# Patient Record
Sex: FEMALE | Race: WHITE | NOT HISPANIC OR LATINO | Employment: STUDENT | URBAN - METROPOLITAN AREA
[De-identification: names, ages, dates, MRNs, and addresses within clinical notes are randomized per-mention and may not be internally consistent; named-entity substitution may affect disease eponyms.]

---

## 2023-08-11 LAB
ALANINE AMINOTRANSFERASE (SGPT) (U/L) IN SER/PLAS: 34 U/L (ref 7–45)
ALBUMIN (G/DL) IN SER/PLAS: 5 G/DL (ref 3.4–5)
ALKALINE PHOSPHATASE (U/L) IN SER/PLAS: 47 U/L (ref 33–110)
ANION GAP IN SER/PLAS: 15 MMOL/L (ref 10–20)
ASPARTATE AMINOTRANSFERASE (SGOT) (U/L) IN SER/PLAS: 28 U/L (ref 9–39)
BILIRUBIN TOTAL (MG/DL) IN SER/PLAS: 0.9 MG/DL (ref 0–1.2)
CALCIUM (MG/DL) IN SER/PLAS: 10 MG/DL (ref 8.6–10.3)
CARBON DIOXIDE, TOTAL (MMOL/L) IN SER/PLAS: 25 MMOL/L (ref 21–32)
CHLORIDE (MMOL/L) IN SER/PLAS: 102 MMOL/L (ref 98–107)
CREATININE (MG/DL) IN SER/PLAS: 0.84 MG/DL (ref 0.5–1.05)
ERYTHROCYTE DISTRIBUTION WIDTH (RATIO) BY AUTOMATED COUNT: 12.5 % (ref 11.5–14.5)
ERYTHROCYTE MEAN CORPUSCULAR HEMOGLOBIN CONCENTRATION (G/DL) BY AUTOMATED: 32.5 G/DL (ref 32–36)
ERYTHROCYTE MEAN CORPUSCULAR VOLUME (FL) BY AUTOMATED COUNT: 89 FL (ref 80–100)
ERYTHROCYTES (10*6/UL) IN BLOOD BY AUTOMATED COUNT: 5.07 X10E12/L (ref 4–5.2)
GFR FEMALE: >90 ML/MIN/1.73M2
GLUCOSE (MG/DL) IN SER/PLAS: 66 MG/DL (ref 74–99)
HEMATOCRIT (%) IN BLOOD BY AUTOMATED COUNT: 45.3 % (ref 36–46)
HEMOGLOBIN (G/DL) IN BLOOD: 14.7 G/DL (ref 12–16)
LEUKOCYTES (10*3/UL) IN BLOOD BY AUTOMATED COUNT: 5.2 X10E9/L (ref 4.4–11.3)
MAGNESIUM (MG/DL) IN SER/PLAS: 2.34 MG/DL (ref 1.6–2.4)
PLATELETS (10*3/UL) IN BLOOD AUTOMATED COUNT: 322 X10E9/L (ref 150–450)
POTASSIUM (MMOL/L) IN SER/PLAS: 4.5 MMOL/L (ref 3.5–5.3)
PROTEIN TOTAL: 8 G/DL (ref 6.4–8.2)
SODIUM (MMOL/L) IN SER/PLAS: 137 MMOL/L (ref 136–145)
THYROTROPIN (MIU/L) IN SER/PLAS BY DETECTION LIMIT <= 0.05 MIU/L: 2.73 MIU/L (ref 0.44–3.98)
UREA NITROGEN (MG/DL) IN SER/PLAS: 12 MG/DL (ref 6–23)

## 2023-08-12 LAB
CALCIDIOL (25 OH VITAMIN D3) (NG/ML) IN SER/PLAS: 39 NG/ML
COBALAMIN (VITAMIN B12) (PG/ML) IN SER/PLAS: 432 PG/ML (ref 211–911)

## 2023-08-14 LAB
AMORPHOUS CRYSTALS, URINE: NORMAL /HPF
APPEARANCE, URINE: NORMAL
ASCORBIC ACID: NORMAL MG/DL
BACTERIA, URINE: NORMAL /HPF
BILIRUBIN, URINE: NORMAL
BLOOD, URINE: NORMAL
BROAD CASTS, URINE: NORMAL /LPF
BUDDING YEAST, URINE: NORMAL /HPF
CALCIUM CARBONATE CRYSTALS, URINE: NORMAL /HPF
CALCIUM OXALATE CRYSTALS, URINE: NORMAL /HPF
CALCIUM PHOSPHATE CRYSTALS, URINE: NORMAL /HPF
COLOR, URINE: NORMAL
CYSTINE CRYSTALS, URINE: NORMAL /HPF
EPITHELIAL CASTS, URINE: NORMAL /LPF
FAT, URINE: NORMAL /HPF
FATTY CASTS, URINE: NORMAL /LPF
FINE GRANULAR CASTS, URINE: NORMAL /LPF
GLUCOSE, URINE: NORMAL
HYALINE CASTS, URINE: NORMAL /LPF
KETONES, URINE: NORMAL
LEUCINE CRYSTALS, URINE: NORMAL /HPF
LEUKOCYTE ESTERASE, URINE: NORMAL
MIXED CELLULAR CASTS, URINE: NORMAL /LPF
MUCUS, URINE: NORMAL /LPF
NITRITE, URINE: NORMAL
OVAL FAT BODIES, URINE: NORMAL /HPF
PH, URINE: NORMAL
PROTEIN, URINE: NORMAL
RBC CASTS, URINE: NORMAL /LPF
RBC CLUMPS, URINE: NORMAL /HPF
RBC, URINE: NORMAL
RENAL EPITHELIAL CELLS, URINE: NORMAL /HPF
SPECIFIC GRAVITY, URINE: NORMAL
SPERMATOZOA, URINE: NORMAL /HPF
SQUAMOUS EPITHELIAL CELLS, URINE: NORMAL /HPF
TRANSITIONAL EPITHELIAL CELLS, URINE: NORMAL /HPF
TRICHOMONAS, URINE: NORMAL /HPF
TRIPLE PHOSPHATE CRYSTALS, URINE: NORMAL /HPF
TYROSINE CRYSTALS, URINE: NORMAL /HPF
URIC ACID (URATE) CRYSTALS, URINE: NORMAL /HPF
URINALYSIS MICROSCOPIC COMMENT: NORMAL
UROBILINOGEN, URINE: NORMAL
WAXY CASTS, URINE: NORMAL /LPF
WBC CASTS, URINE: NORMAL /LPF
WBC CLUMPS, URINE: NORMAL /HPF
WBC, URINE: NORMAL
YEAST HYPHAE, URINE: NORMAL /HPF

## 2023-12-14 ENCOUNTER — LAB (OUTPATIENT)
Dept: LAB | Facility: LAB | Age: 23
End: 2023-12-14
Payer: COMMERCIAL

## 2023-12-14 DIAGNOSIS — E78.01 FAMILIAL HYPERCHOLESTEROLEMIA: Primary | ICD-10-CM

## 2023-12-14 LAB
ALBUMIN SERPL BCP-MCNC: 4.7 G/DL (ref 3.4–5)
ALP SERPL-CCNC: 45 U/L (ref 33–110)
ALT SERPL W P-5'-P-CCNC: 32 U/L (ref 7–45)
ANION GAP SERPL CALC-SCNC: 13 MMOL/L (ref 10–20)
AST SERPL W P-5'-P-CCNC: 26 U/L (ref 9–39)
BILIRUB SERPL-MCNC: 0.7 MG/DL (ref 0–1.2)
BUN SERPL-MCNC: 16 MG/DL (ref 6–23)
CALCIUM SERPL-MCNC: 9.6 MG/DL (ref 8.6–10.3)
CHLORIDE SERPL-SCNC: 103 MMOL/L (ref 98–107)
CHOLEST SERPL-MCNC: 141 MG/DL (ref 0–199)
CHOLESTEROL/HDL RATIO: 2.7
CO2 SERPL-SCNC: 27 MMOL/L (ref 21–32)
CREAT SERPL-MCNC: 0.72 MG/DL (ref 0.5–1.05)
GFR SERPL CREATININE-BSD FRML MDRD: >90 ML/MIN/1.73M*2
GLUCOSE SERPL-MCNC: 71 MG/DL (ref 74–99)
HDLC SERPL-MCNC: 53.2 MG/DL
LDLC SERPL CALC-MCNC: 77 MG/DL
NON HDL CHOLESTEROL: 88 MG/DL (ref 0–149)
POTASSIUM SERPL-SCNC: 4.3 MMOL/L (ref 3.5–5.3)
PROT SERPL-MCNC: 7 G/DL (ref 6.4–8.2)
SODIUM SERPL-SCNC: 139 MMOL/L (ref 136–145)
TRIGL SERPL-MCNC: 55 MG/DL (ref 0–149)
VLDL: 11 MG/DL (ref 0–40)

## 2023-12-14 PROCEDURE — 80061 LIPID PANEL: CPT

## 2023-12-14 PROCEDURE — 80053 COMPREHEN METABOLIC PANEL: CPT

## 2023-12-14 PROCEDURE — 36415 COLL VENOUS BLD VENIPUNCTURE: CPT

## 2023-12-19 ENCOUNTER — APPOINTMENT (OUTPATIENT)
Dept: ENDOCRINOLOGY | Facility: CLINIC | Age: 23
End: 2023-12-19
Payer: COMMERCIAL

## 2023-12-22 DIAGNOSIS — E78.01 FAMILIAL HYPERCHOLESTEROLEMIA: Primary | ICD-10-CM

## 2023-12-24 RX ORDER — ROSUVASTATIN CALCIUM 40 MG/1
40 TABLET, COATED ORAL DAILY
Qty: 90 TABLET | Refills: 3 | Status: SHIPPED | OUTPATIENT
Start: 2023-12-24

## 2024-03-19 DIAGNOSIS — E78.01 FAMILIAL HYPERCHOLESTEROLEMIA: Primary | ICD-10-CM

## 2024-03-19 RX ORDER — EZETIMIBE 10 MG/1
10 TABLET ORAL DAILY
COMMUNITY
End: 2024-03-19 | Stop reason: SDUPTHER

## 2024-03-19 RX ORDER — EZETIMIBE 10 MG/1
10 TABLET ORAL DAILY
Qty: 90 TABLET | Refills: 3 | Status: SHIPPED | OUTPATIENT
Start: 2024-03-19 | End: 2025-03-19

## 2024-05-01 ENCOUNTER — OFFICE VISIT (OUTPATIENT)
Dept: ENDOCRINOLOGY | Facility: CLINIC | Age: 24
End: 2024-05-01
Payer: COMMERCIAL

## 2024-05-01 VITALS
BODY MASS INDEX: 19.3 KG/M2 | WEIGHT: 116 LBS | HEART RATE: 80 BPM | DIASTOLIC BLOOD PRESSURE: 68 MMHG | SYSTOLIC BLOOD PRESSURE: 106 MMHG

## 2024-05-01 DIAGNOSIS — E78.01 FAMILIAL HYPERCHOLESTEROLEMIA DUE TO HETEROZYGOUS LOW DENSITY LIPOPROTEIN (LDL) RECEPTOR MUTATION: Primary | ICD-10-CM

## 2024-05-01 PROCEDURE — 99214 OFFICE O/P EST MOD 30 MIN: CPT | Performed by: INTERNAL MEDICINE

## 2024-05-01 PROCEDURE — 1036F TOBACCO NON-USER: CPT | Performed by: INTERNAL MEDICINE

## 2024-05-01 ASSESSMENT — ENCOUNTER SYMPTOMS
DIARRHEA: 0
TROUBLE SWALLOWING: 0
FATIGUE: 0
NERVOUS/ANXIOUS: 0
WEAKNESS: 0
PALPITATIONS: 0
UNEXPECTED WEIGHT CHANGE: 0
HEADACHES: 0
ABDOMINAL PAIN: 0
SHORTNESS OF BREATH: 0
CONSTIPATION: 0
VOMITING: 0
NAUSEA: 0
FEVER: 0
NECK PAIN: 0
TREMORS: 0

## 2024-05-01 NOTE — LETTER
May 1, 2024     Papito Randall MD  [address] [facility] [address]  [place] [address]    Patient: Capri Mcintosh   YOB: 2000   Date of Visit: 5/1/2024       Dear Dr. Papito Randall MD:    Thank you for referring Capri Mcintosh to me for evaluation. Below are my notes for this consultation.  If you have questions, please do not hesitate to call me. I look forward to following your patient along with you.       Sincerely,     Farhan Pierce MD      CC: No Recipients  ______________________________________________________________________________________    History Of Present Illness  Capri Mcintosh is a 23 y.o. female with heterozygous familial hypercholesterolemia    Rosuvastatin 40 mg/day started December 2019  Ezetimibe 10 mg/day added in 2021     No muscular symptoms  No current complaints     Genetics consultation, heterogeneous LDL-R mutation.     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    Surgical History  She has a past surgical history that includes Other surgical history (05/12/2014).     Social History  She reports that she has never smoked. She has never used smokeless tobacco. No history on file for alcohol use and drug use.    Family History  No family history on file.    Medications  Current Outpatient Medications   Medication Instructions   • ezetimibe (ZETIA) 10 mg, oral, Daily   • rosuvastatin (CRESTOR) 40 mg, oral, Daily       Allergies  Patient has no known allergies.    Review of  Systems   Constitutional:  Negative for fatigue, fever and unexpected weight change.   HENT:  Negative for trouble swallowing.    Eyes:  Negative for visual disturbance.   Respiratory:  Negative for shortness of breath.    Cardiovascular:  Negative for chest pain and palpitations.   Gastrointestinal:  Negative for abdominal pain, constipation, diarrhea, nausea and vomiting.   Endocrine: Negative for cold intolerance and heat intolerance.   Genitourinary:  Negative for menstrual problem.   Musculoskeletal:  Negative for neck pain.   Skin:  Negative for rash.   Neurological:  Negative for tremors, weakness and headaches.   Psychiatric/Behavioral:  The patient is not nervous/anxious.          Last Recorded Vitals  Blood pressure 106/68, pulse 80, weight 52.6 kg (116 lb).    Physical Exam  Constitutional:       General: She is not in acute distress.  HENT:      Head: Normocephalic.      Mouth/Throat:      Mouth: Mucous membranes are moist.   Eyes:      Extraocular Movements: Extraocular movements intact.   Neck:      Thyroid: No thyroid mass or thyromegaly.   Lymphadenopathy:      Cervical: No cervical adenopathy.   Neurological:      Mental Status: She is alert.      Motor: No tremor.   Psychiatric:         Mood and Affect: Affect normal.          Relevant Results   Latest Reference Range & Units 12/14/23 09:07   GLUCOSE 74 - 99 mg/dL 71 (L)   SODIUM 136 - 145 mmol/L 139   POTASSIUM 3.5 - 5.3 mmol/L 4.3   CHLORIDE 98 - 107 mmol/L 103   Bicarbonate 21 - 32 mmol/L 27   Anion Gap 10 - 20 mmol/L 13   Blood Urea Nitrogen 6 - 23 mg/dL 16   Creatinine 0.50 - 1.05 mg/dL 0.72   EGFR >60 mL/min/1.73m*2 >90   Calcium 8.6 - 10.3 mg/dL 9.6   Albumin 3.4 - 5.0 g/dL 4.7   Alkaline Phosphatase 33 - 110 U/L 45   ALT 7 - 45 U/L 32   AST 9 - 39 U/L 26   Bilirubin Total 0.0 - 1.2 mg/dL 0.7   HDL CHOLESTEROL mg/dL 53.2   Cholesterol/HDL Ratio  2.7   LDL Calculated <=119 mg/dL 77   VLDL 0 - 40 mg/dL 11   TRIGLYCERIDES 0 - 149 mg/dL 55   Non  HDL Cholesterol 0 - 149 mg/dL 88   Total Protein 6.4 - 8.2 g/dL 7.0   CHOLESTEROL 0 - 199 mg/dL 141         IMPRESSION  FAMILIAL HYPERCHOLESTEROLEMIA, HETEROZYGOUS  LDL much improved on Rosuvastatin and ezetimibe.  Tolerating rosuvastatin      RECOMMENDATIONS  Continue current program    Follow up December  Draw labs before next appointment.

## 2024-05-01 NOTE — PATIENT INSTRUCTIONS
RECOMMENDATIONS  Continue current program    Follow up December  Draw labs before next appointment.

## 2024-05-01 NOTE — PROGRESS NOTES
History Of Present Illness  Capri Mcintosh is a 23 y.o. female with heterozygous familial hypercholesterolemia    Rosuvastatin 40 mg/day started December 2019  Ezetimibe 10 mg/day added in 2021     No muscular symptoms  No current complaints     Genetics consultation, heterogeneous LDL-R mutation.     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    Surgical History  She has a past surgical history that includes Other surgical history (05/12/2014).     Social History  She reports that she has never smoked. She has never used smokeless tobacco. No history on file for alcohol use and drug use.    Family History  No family history on file.    Medications  Current Outpatient Medications   Medication Instructions    ezetimibe (ZETIA) 10 mg, oral, Daily    rosuvastatin (CRESTOR) 40 mg, oral, Daily       Allergies  Patient has no known allergies.    Review of Systems   Constitutional:  Negative for fatigue, fever and unexpected weight change.   HENT:  Negative for trouble swallowing.    Eyes:  Negative for visual disturbance.   Respiratory:  Negative for shortness of breath.    Cardiovascular:  Negative for chest pain and palpitations.   Gastrointestinal:  Negative for abdominal pain, constipation, diarrhea, nausea and vomiting.   Endocrine: Negative for cold intolerance and heat intolerance.   Genitourinary:  Negative for menstrual problem.   Musculoskeletal:  Negative for neck pain.   Skin:  Negative for rash.   Neurological:   Negative for tremors, weakness and headaches.   Psychiatric/Behavioral:  The patient is not nervous/anxious.          Last Recorded Vitals  Blood pressure 106/68, pulse 80, weight 52.6 kg (116 lb).    Physical Exam  Constitutional:       General: She is not in acute distress.  HENT:      Head: Normocephalic.      Mouth/Throat:      Mouth: Mucous membranes are moist.   Eyes:      Extraocular Movements: Extraocular movements intact.   Neck:      Thyroid: No thyroid mass or thyromegaly.   Lymphadenopathy:      Cervical: No cervical adenopathy.   Neurological:      Mental Status: She is alert.      Motor: No tremor.   Psychiatric:         Mood and Affect: Affect normal.          Relevant Results   Latest Reference Range & Units 12/14/23 09:07   GLUCOSE 74 - 99 mg/dL 71 (L)   SODIUM 136 - 145 mmol/L 139   POTASSIUM 3.5 - 5.3 mmol/L 4.3   CHLORIDE 98 - 107 mmol/L 103   Bicarbonate 21 - 32 mmol/L 27   Anion Gap 10 - 20 mmol/L 13   Blood Urea Nitrogen 6 - 23 mg/dL 16   Creatinine 0.50 - 1.05 mg/dL 0.72   EGFR >60 mL/min/1.73m*2 >90   Calcium 8.6 - 10.3 mg/dL 9.6   Albumin 3.4 - 5.0 g/dL 4.7   Alkaline Phosphatase 33 - 110 U/L 45   ALT 7 - 45 U/L 32   AST 9 - 39 U/L 26   Bilirubin Total 0.0 - 1.2 mg/dL 0.7   HDL CHOLESTEROL mg/dL 53.2   Cholesterol/HDL Ratio  2.7   LDL Calculated <=119 mg/dL 77   VLDL 0 - 40 mg/dL 11   TRIGLYCERIDES 0 - 149 mg/dL 55   Non HDL Cholesterol 0 - 149 mg/dL 88   Total Protein 6.4 - 8.2 g/dL 7.0   CHOLESTEROL 0 - 199 mg/dL 141         IMPRESSION  FAMILIAL HYPERCHOLESTEROLEMIA, HETEROZYGOUS  LDL much improved on Rosuvastatin and ezetimibe.  Tolerating rosuvastatin      RECOMMENDATIONS  Continue current program    Follow up December  Draw labs before next appointment.

## 2024-08-07 ASSESSMENT — PROMIS GLOBAL HEALTH SCALE
RATE_MENTAL_HEALTH: VERY GOOD
RATE_AVERAGE_FATIGUE: MILD
RATE_QUALITY_OF_LIFE: EXCELLENT
RATE_SOCIAL_SATISFACTION: VERY GOOD
CARRYOUT_PHYSICAL_ACTIVITIES: COMPLETELY
CARRYOUT_SOCIAL_ACTIVITIES: VERY GOOD
RATE_AVERAGE_PAIN: 0
EMOTIONAL_PROBLEMS: SOMETIMES
RATE_GENERAL_HEALTH: EXCELLENT
RATE_PHYSICAL_HEALTH: EXCELLENT

## 2024-08-08 ENCOUNTER — OFFICE VISIT (OUTPATIENT)
Dept: PRIMARY CARE | Facility: CLINIC | Age: 24
End: 2024-08-08
Payer: COMMERCIAL

## 2024-08-08 VITALS
HEART RATE: 91 BPM | HEIGHT: 65 IN | OXYGEN SATURATION: 98 % | WEIGHT: 113 LBS | SYSTOLIC BLOOD PRESSURE: 104 MMHG | DIASTOLIC BLOOD PRESSURE: 62 MMHG | BODY MASS INDEX: 18.83 KG/M2

## 2024-08-08 DIAGNOSIS — Z00.00 ROUTINE GENERAL MEDICAL EXAMINATION AT A HEALTH CARE FACILITY: Primary | ICD-10-CM

## 2024-08-08 DIAGNOSIS — F41.9 ANXIETY: ICD-10-CM

## 2024-08-08 DIAGNOSIS — K58.0 IRRITABLE BOWEL SYNDROME WITH DIARRHEA: ICD-10-CM

## 2024-08-08 DIAGNOSIS — Z12.4 CERVICAL CANCER SCREENING: ICD-10-CM

## 2024-08-08 PROCEDURE — 99395 PREV VISIT EST AGE 18-39: CPT | Performed by: FAMILY MEDICINE

## 2024-08-08 PROCEDURE — 1036F TOBACCO NON-USER: CPT | Performed by: FAMILY MEDICINE

## 2024-08-08 PROCEDURE — 3008F BODY MASS INDEX DOCD: CPT | Performed by: FAMILY MEDICINE

## 2024-08-08 RX ORDER — FLUOXETINE 10 MG/1
10 CAPSULE ORAL DAILY
Qty: 30 CAPSULE | Refills: 1 | Status: SHIPPED | OUTPATIENT
Start: 2024-08-08 | End: 2024-10-07

## 2024-08-08 RX ORDER — TRETINOIN 0.25 MG/G
CREAM TOPICAL
COMMUNITY

## 2024-08-08 RX ORDER — CLINDAMYCIN PHOSPHATE AND BENZOYL PEROXIDE 10; 50 MG/G; MG/G
GEL TOPICAL
COMMUNITY

## 2024-08-08 ASSESSMENT — ENCOUNTER SYMPTOMS
MYALGIAS: 0
CONFUSION: 0
INSOMNIA: 0
WHEEZING: 0
THOUGHT CONTENT - OBSESSIONS: 0
FATIGUE: 1
COUGH: 0
ARTHRALGIAS: 0
DIARRHEA: 1
SHORTNESS OF BREATH: 0
FEVER: 0
WEAKNESS: 0
DEPRESSED MOOD: 0
RESTLESSNESS: 0
TREMORS: 0
NERVOUS/ANXIOUS: 1
HEMATURIA: 0
NAUSEA: 0
CHILLS: 0
VOMITING: 0
DIZZINESS: 0
FREQUENCY: 0
CONSTIPATION: 0
PALPITATIONS: 0
DECREASED CONCENTRATION: 1

## 2024-08-08 ASSESSMENT — PAIN SCALES - GENERAL: PAINLEVEL: 0-NO PAIN

## 2024-08-08 ASSESSMENT — PATIENT HEALTH QUESTIONNAIRE - PHQ9
1. LITTLE INTEREST OR PLEASURE IN DOING THINGS: NOT AT ALL
SUM OF ALL RESPONSES TO PHQ9 QUESTIONS 1 AND 2: 0
2. FEELING DOWN, DEPRESSED OR HOPELESS: NOT AT ALL

## 2024-08-08 NOTE — PROGRESS NOTES
"Subjective   Patient ID: Capri Mcintosh is a 23 y.o. female who presents for pap and Annual Exam.    LMP- 7/14/2024 - she is not sexually active, denies any vaginal discharge, had a pap 3 years ago - declines OCP, normal menses    Anxiety  Presents for initial visit. Onset was 1 to 6 months ago. The problem has been gradually worsening. Symptoms include decreased concentration, excessive worry and nervous/anxious behavior. Patient reports no chest pain, confusion, depressed mood, dizziness, insomnia, nausea, obsessions, palpitations, restlessness, shortness of breath or suicidal ideas. Symptoms occur most days. The severity of symptoms is moderate. The symptoms are aggravated by work stress. The quality of sleep is good. Nighttime awakenings: occasional.     There are no known risk factors.        Review of Systems   Constitutional:  Positive for fatigue. Negative for chills and fever.   HENT:  Negative for ear pain and postnasal drip.    Respiratory:  Negative for cough, shortness of breath and wheezing.    Cardiovascular:  Negative for chest pain, palpitations and leg swelling.   Gastrointestinal:  Positive for diarrhea. Negative for constipation, nausea and vomiting.   Genitourinary:  Negative for frequency and hematuria.   Musculoskeletal:  Negative for arthralgias and myalgias.   Skin:  Negative for rash.   Neurological:  Negative for dizziness, tremors and weakness.   Psychiatric/Behavioral:  Positive for decreased concentration. Negative for confusion and suicidal ideas. The patient is nervous/anxious. The patient does not have insomnia.        Objective   /62   Pulse 91   Ht 1.651 m (5' 5\")   Wt 51.3 kg (113 lb)   SpO2 98%   BMI 18.80 kg/m²     Physical Exam  Exam conducted with a chaperone present.   Constitutional:       General: She is not in acute distress.     Appearance: Normal appearance. She is not ill-appearing.   HENT:      Head: Normocephalic.      Right Ear: Tympanic membrane and " external ear normal.      Left Ear: Tympanic membrane and external ear normal.      Nose: Nose normal. No congestion.      Mouth/Throat:      Mouth: Mucous membranes are moist.      Pharynx: No posterior oropharyngeal erythema.   Cardiovascular:      Rate and Rhythm: Normal rate and regular rhythm.      Pulses: Normal pulses.      Heart sounds: Normal heart sounds. No murmur heard.  Pulmonary:      Effort: Pulmonary effort is normal. No respiratory distress.      Breath sounds: Normal breath sounds. No wheezing.   Chest:   Breasts:     Right: Normal. No mass, nipple discharge, skin change or tenderness.      Left: Normal. No mass, nipple discharge, skin change or tenderness.   Abdominal:      General: Bowel sounds are normal.      Palpations: Abdomen is soft. There is no mass.      Tenderness: There is no abdominal tenderness.   Genitourinary:     General: Normal vulva.      Vagina: Normal.      Cervix: Normal and dilated.      Uterus: Normal.       Adnexa: Right adnexa normal and left adnexa normal.   Musculoskeletal:         General: No tenderness. Normal range of motion.      Cervical back: Neck supple. No tenderness.      Right lower leg: No edema.      Left lower leg: No edema.   Lymphadenopathy:      Cervical: No cervical adenopathy.   Skin:     General: Skin is warm.      Findings: No rash.   Neurological:      General: No focal deficit present.      Mental Status: She is alert and oriented to person, place, and time.   Psychiatric:         Mood and Affect: Mood normal.         Behavior: Behavior normal.         Assessment/Plan   Problem List Items Addressed This Visit    None  Visit Diagnoses         Codes    Routine general medical examination at a health care facility    -  Primary Z00.00    Anxiety     F41.9    recommend 8 hours of sleep, yoga, relaxation     Relevant Medications    FLUoxetine (PROzac) 10 mg capsule    Cervical cancer screening     Z12.4    Relevant Orders    THINPREP PAP TEST     Irritable bowel syndrome with diarrhea     K58.0

## 2024-08-21 LAB
CYTOLOGY CMNT CVX/VAG CYTO-IMP: NORMAL
LAB AP HPV GENOTYPE QUESTION: YES
LAB AP HPV HR: NORMAL
LABORATORY COMMENT REPORT: NORMAL
LMP START DATE: NORMAL
PATH REPORT.TOTAL CANCER: NORMAL

## 2024-09-03 DIAGNOSIS — F41.9 ANXIETY: ICD-10-CM

## 2024-09-03 RX ORDER — FLUOXETINE 10 MG/1
10 CAPSULE ORAL DAILY
Qty: 90 CAPSULE | Refills: 3 | Status: SHIPPED | OUTPATIENT
Start: 2024-09-03 | End: 2025-09-03

## 2024-12-16 ENCOUNTER — LAB (OUTPATIENT)
Dept: LAB | Facility: LAB | Age: 24
End: 2024-12-16
Payer: COMMERCIAL

## 2024-12-16 DIAGNOSIS — E78.01 FAMILIAL HYPERCHOLESTEROLEMIA DUE TO HETEROZYGOUS LOW DENSITY LIPOPROTEIN (LDL) RECEPTOR MUTATION: ICD-10-CM

## 2024-12-16 LAB
ALBUMIN SERPL BCP-MCNC: 4.4 G/DL (ref 3.4–5)
ALP SERPL-CCNC: 40 U/L (ref 33–110)
ALT SERPL W P-5'-P-CCNC: 25 U/L (ref 7–45)
ANION GAP SERPL CALC-SCNC: 13 MMOL/L (ref 10–20)
AST SERPL W P-5'-P-CCNC: 21 U/L (ref 9–39)
BILIRUB SERPL-MCNC: 0.5 MG/DL (ref 0–1.2)
BUN SERPL-MCNC: 14 MG/DL (ref 6–23)
CALCIUM SERPL-MCNC: 9.1 MG/DL (ref 8.6–10.3)
CHLORIDE SERPL-SCNC: 105 MMOL/L (ref 98–107)
CHOLEST SERPL-MCNC: 140 MG/DL (ref 0–199)
CHOLESTEROL/HDL RATIO: 2.6
CO2 SERPL-SCNC: 24 MMOL/L (ref 21–32)
CREAT SERPL-MCNC: 0.7 MG/DL (ref 0.5–1.05)
EGFRCR SERPLBLD CKD-EPI 2021: >90 ML/MIN/1.73M*2
GLUCOSE SERPL-MCNC: 81 MG/DL (ref 74–99)
HDLC SERPL-MCNC: 54.9 MG/DL
LDLC SERPL CALC-MCNC: 75 MG/DL
NON HDL CHOLESTEROL: 85 MG/DL (ref 0–149)
POTASSIUM SERPL-SCNC: 4.2 MMOL/L (ref 3.5–5.3)
PROT SERPL-MCNC: 6.6 G/DL (ref 6.4–8.2)
SODIUM SERPL-SCNC: 138 MMOL/L (ref 136–145)
TRIGL SERPL-MCNC: 51 MG/DL (ref 0–114)
VLDL: 10 MG/DL (ref 0–40)

## 2024-12-16 PROCEDURE — 80061 LIPID PANEL: CPT

## 2024-12-16 PROCEDURE — 80053 COMPREHEN METABOLIC PANEL: CPT

## 2024-12-16 PROCEDURE — 36415 COLL VENOUS BLD VENIPUNCTURE: CPT

## 2024-12-17 ENCOUNTER — APPOINTMENT (OUTPATIENT)
Dept: ENDOCRINOLOGY | Facility: CLINIC | Age: 24
End: 2024-12-17
Payer: COMMERCIAL

## 2024-12-17 VITALS
DIASTOLIC BLOOD PRESSURE: 77 MMHG | WEIGHT: 115 LBS | HEART RATE: 94 BPM | SYSTOLIC BLOOD PRESSURE: 119 MMHG | BODY MASS INDEX: 19.14 KG/M2

## 2024-12-17 DIAGNOSIS — E78.01 FAMILIAL HYPERCHOLESTEROLEMIA: ICD-10-CM

## 2024-12-17 PROCEDURE — 1036F TOBACCO NON-USER: CPT | Performed by: INTERNAL MEDICINE

## 2024-12-17 PROCEDURE — 99213 OFFICE O/P EST LOW 20 MIN: CPT | Performed by: INTERNAL MEDICINE

## 2024-12-17 RX ORDER — EZETIMIBE 10 MG/1
10 TABLET ORAL DAILY
Qty: 90 TABLET | Refills: 3 | Status: SHIPPED | OUTPATIENT
Start: 2024-12-17 | End: 2025-12-17

## 2024-12-17 RX ORDER — ROSUVASTATIN CALCIUM 40 MG/1
40 TABLET, COATED ORAL DAILY
Qty: 90 TABLET | Refills: 3 | Status: SHIPPED | OUTPATIENT
Start: 2024-12-17

## 2024-12-17 ASSESSMENT — ENCOUNTER SYMPTOMS
NAUSEA: 0
VOMITING: 0
TREMORS: 0
CONSTIPATION: 0
ABDOMINAL PAIN: 0
FEVER: 0
TROUBLE SWALLOWING: 0
NECK PAIN: 0
PALPITATIONS: 0
WEAKNESS: 0
HEADACHES: 0
SHORTNESS OF BREATH: 0
DIARRHEA: 0
NERVOUS/ANXIOUS: 0
UNEXPECTED WEIGHT CHANGE: 0
FATIGUE: 0

## 2024-12-17 NOTE — PATIENT INSTRUCTIONS
RECOMMENDATIONS  Continue current program     Follow up December 2025  Draw labs before next appointment.

## 2024-12-17 NOTE — PROGRESS NOTES
History Of Present Illness  Capri Mcintosh is a 24 y.o. female with  heterozygous familial hypercholesterolemia     Rosuvastatin 40 mg/day started December 2019  Ezetimibe 10 mg/day added in 2021     No muscular symptoms  No current complaints     Genetics consultation, heterogeneous LDL-R mutation.     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    Surgical History  She has a past surgical history that includes Other surgical history (05/12/2014).     Social History  She reports that she has never smoked. She has never used smokeless tobacco. She reports that she does not drink alcohol and does not use drugs.    Family History  No family history on file.    Medications  Current Outpatient Medications   Medication Instructions    clindamycin-benzoyl peroxide (Duac) 1.2-5% gel apply 1 application externally daily    ezetimibe (ZETIA) 10 mg, oral, Daily    FLUoxetine (PROZAC) 10 mg, oral, Daily    rosuvastatin (CRESTOR) 40 mg, oral, Daily    tretinoin (Retin-A) 0.025 % cream Apply externally in the evening to face once a day       Allergies  Patient has no known allergies.    Review of Systems   Constitutional:  Negative for fatigue, fever and unexpected weight change.   HENT:  Negative for trouble swallowing.    Eyes:  Negative for visual disturbance.   Respiratory:  Negative for shortness of breath.    Cardiovascular:  Negative for chest pain and palpitations.   Gastrointestinal:  Negative for abdominal pain, constipation,  diarrhea, nausea and vomiting.   Endocrine: Negative for cold intolerance and heat intolerance.   Musculoskeletal:  Negative for neck pain.   Skin:  Negative for rash.   Neurological:  Negative for tremors, weakness and headaches.   Psychiatric/Behavioral:  The patient is not nervous/anxious.          Last Recorded Vitals  Blood pressure 119/77, pulse 94, weight 52.2 kg (115 lb).    Physical Exam  Constitutional:       General: She is not in acute distress.  Neurological:      Mental Status: She is alert.   Psychiatric:         Mood and Affect: Affect normal.          Relevant Results   Latest Reference Range & Units 12/16/24 09:11   GLUCOSE 74 - 99 mg/dL 81   SODIUM 136 - 145 mmol/L 138   POTASSIUM 3.5 - 5.3 mmol/L 4.2   CHLORIDE 98 - 107 mmol/L 105   Bicarbonate 21 - 32 mmol/L 24   Anion Gap 10 - 20 mmol/L 13   Blood Urea Nitrogen 6 - 23 mg/dL 14   Creatinine 0.50 - 1.05 mg/dL 0.70   EGFR >60 mL/min/1.73m*2 >90   Calcium 8.6 - 10.3 mg/dL 9.1   Albumin 3.4 - 5.0 g/dL 4.4   Alkaline Phosphatase 33 - 110 U/L 40   ALT 7 - 45 U/L 25   AST 9 - 39 U/L 21   Bilirubin Total 0.0 - 1.2 mg/dL 0.5   HDL CHOLESTEROL mg/dL 54.9   Cholesterol/HDL Ratio  2.6   LDL Calculated <=119 mg/dL 75   VLDL 0 - 40 mg/dL 10   TRIGLYCERIDES 0 - 114 mg/dL 51   Non HDL Cholesterol 0 - 149 mg/dL 85   Total Protein 6.4 - 8.2 g/dL 6.6   CHOLESTEROL 0 - 199 mg/dL 140         IMPRESSION  FAMILIAL HYPERCHOLESTEROLEMIA, HETEROZYGOUS  LDL much improved on Rosuvastatin and ezetimibe.  Tolerating rosuvastatin      RECOMMENDATIONS  Continue current program     Follow up December 2025  Draw labs before next appointment.

## 2025-04-15 ENCOUNTER — PATIENT MESSAGE (OUTPATIENT)
Dept: PRIMARY CARE | Facility: CLINIC | Age: 25
End: 2025-04-15
Payer: COMMERCIAL

## 2025-04-15 DIAGNOSIS — L70.9 ACNE, UNSPECIFIED ACNE TYPE: ICD-10-CM

## 2025-04-15 RX ORDER — TRETINOIN 0.25 MG/G
CREAM TOPICAL NIGHTLY
Qty: 45 G | Refills: 2 | Status: SHIPPED | OUTPATIENT
Start: 2025-04-15 | End: 2025-05-15

## 2025-04-15 RX ORDER — CLINDAMYCIN PHOSPHATE AND BENZOYL PEROXIDE 10; 50 MG/G; MG/G
GEL TOPICAL DAILY
Qty: 45 G | Refills: 2 | Status: SHIPPED | OUTPATIENT
Start: 2025-04-15 | End: 2025-05-15

## 2025-12-17 ENCOUNTER — APPOINTMENT (OUTPATIENT)
Dept: ENDOCRINOLOGY | Facility: CLINIC | Age: 25
End: 2025-12-17
Payer: COMMERCIAL